# Patient Record
Sex: FEMALE | NOT HISPANIC OR LATINO | Employment: UNEMPLOYED | URBAN - METROPOLITAN AREA
[De-identification: names, ages, dates, MRNs, and addresses within clinical notes are randomized per-mention and may not be internally consistent; named-entity substitution may affect disease eponyms.]

---

## 2017-04-08 ENCOUNTER — OFFICE VISIT (OUTPATIENT)
Dept: URGENT CARE | Facility: URGENT CARE | Age: 22
End: 2017-04-08
Payer: COMMERCIAL

## 2017-04-08 VITALS
HEIGHT: 64 IN | TEMPERATURE: 98.8 F | BODY MASS INDEX: 35.85 KG/M2 | WEIGHT: 210 LBS | SYSTOLIC BLOOD PRESSURE: 138 MMHG | OXYGEN SATURATION: 99 % | DIASTOLIC BLOOD PRESSURE: 86 MMHG | HEART RATE: 87 BPM

## 2017-04-08 DIAGNOSIS — I88.9 LYMPHADENITIS: Primary | ICD-10-CM

## 2017-04-08 DIAGNOSIS — H92.01 ACUTE PAIN OF RIGHT EAR: ICD-10-CM

## 2017-04-08 PROCEDURE — 99213 OFFICE O/P EST LOW 20 MIN: CPT

## 2017-04-08 RX ORDER — AMOXICILLIN 500 MG/1
500 CAPSULE ORAL 3 TIMES DAILY
Qty: 30 CAPSULE | Refills: 0 | Status: SHIPPED | OUTPATIENT
Start: 2017-04-08 | End: 2017-06-25

## 2017-04-08 NOTE — MR AVS SNAPSHOT
"              After Visit Summary   4/8/2017    Sergey Whitehead    MRN: [id number]           Patient Information     Date Of Birth          1995        Visit Information        Provider Department      4/8/2017 6:00 PM Provider, [facility]        Today's Diagnoses     Lymphadenitis    -  1    Acute pain of right ear           Follow-ups after your visit        Your next 10 appointments already scheduled     Apr 11, 2017  2:40 PM CDT   SHORT with SARY Motley CNP   [facility] ([facility])    [address] [place] [address]   [phone]              Who to contact     If you have questions or need follow up information about today's clinic visit or your schedule please contact [facility] directly at [phone].  Normal or non-critical lab and imaging results will be communicated to you by MyChart, letter or phone within 4 business days after the clinic has received the results. If you do not hear from us within 7 days, please contact the clinic through MyChart or phone. If you have a critical or abnormal lab result, we will notify you by phone as soon as possible.  Submit refill requests through Azooo or call your pharmacy and they will forward the refill request to us. Please allow 3 business days for your refill to be completed.          Additional Information About Your Visit        MyChart Information     Azooo lets you send messages to your doctor, view your test results, renew your prescriptions, schedule appointments and more. To sign up, go to [email or web address] . Click on \"Log in\" on the left side of the screen, which will take you to the Welcome page. Then click on \"Sign up Now\" on the right side of the page.     You will be asked to enter the access code listed below, as well as some personal information. Please follow the directions to create your username " "and password.     Your access code is: [email or web address]  Expires: 2017  1:55 PM     Your access code will  in 90 days. If you need help or a new code, please call your [place] clinic or [phone].        Care EveryWhere ID     This is your Care EveryWhere ID. This could be used by other organizations to access your [facility] [place] medical records  [id number]        Your Vitals Were     Pulse Temperature Height Pulse Oximetry BMI (Body Mass Index)       87 98.8  F (37.1  C) (Oral) 5' 4\" (1.626 m) 99% 36.05 kg/m2        Blood Pressure from Last 3 Encounters:   17 138/86   07/15/16 122/73   16 130/80    Weight from Last 3 Encounters:   17 210 lb (95.3 kg) (98 %)*   07/15/16 209 lb 3.2 oz (94.9 kg) (98 %)*   16 213 lb 1.6 oz (96.7 kg) (98 %)*     * Growth percentiles are based on [facility] 2-20 Years data.              Today, you had the following     No orders found for display         Today's Medication Changes          These changes are accurate as of: 17  6:27 PM.  If you have any questions, ask your nurse or doctor.               Start taking these medicines.        Dose/Directions    amoxicillin 500 MG capsule   Commonly known as:  AMOXIL   Used for:  Lymphadenitis, Acute pain of right ear   Started by:  Provider, [facility]        Dose:  500 mg   Take 1 capsule (500 mg) by mouth 3 times daily   Quantity:  30 capsule   Refills:  0            Where to get your medicines      These medications were sent to [facility]/pharmacy #[id number] - [place], [place] - [address] AT [facility]  [address] [place] [address]    Hours:  24-hours Phone:  [phone]     amoxicillin 500 MG capsule                Primary Care Provider    None Specified       No primary provider on file.        Thank you!     Thank you for choosing [facility]  for your care. Our goal is always to provide you with excellent care. Hearing back from our patients is one way we can " continue to improve our services. Please take a few minutes to complete the written survey that you may receive in the mail after your visit with us. Thank you!             Your Updated Medication List - Protect others around you: Learn how to safely use, store and throw away your medicines at www.disposemymeds.org.          This list is accurate as of: 4/8/17  6:27 PM.  Always use your most recent med list.                   Brand Name Dispense Instructions for use    amoxicillin 500 MG capsule    AMOXIL    30 capsule    Take 1 capsule (500 mg) by mouth 3 times daily       escitalopram 20 MG tablet    LEXAPRO    30 tablet    Take 1 tablet (20 mg) by mouth daily       medroxyPROGESTERone 150 MG/ML injection    DEPO-PROVERA    3 mL    Inject 1 mL (150 mg) into the muscle every 3 months

## 2017-04-08 NOTE — PROGRESS NOTES
"Chief Complaint   Patient presents with     Urgent Care     Ear Problem     Has had pain in right ear for past 2 days, has become excruciating today.  Has taken ibuprofen, benadryl and excedrin but not effective for the pain.     Mass     Right anterior neck lymph node has been swollen/painful for past 2 days, has gotten much worse today.  Strep test was negative Thursday.        HPI:    Sergey Whitehead is a 21 year old female with right ear ache for 2 days.  She has tried lozenges and benadryl.  She has moderate pain.  She has no cough or vomiting.  She has a sore throat.  She has a neg strep on Thur.    ROS:  General:  No night sweats reported  ENT: No epistaxis  Skin: No petechiae  Psychiatric: Not combative  : No hematuria reported    Past Medical History:   Diagnosis Date     Choanal atresia      Constipation      Ureteral reflux        Past Surgical History:   Procedure Laterality Date     SURGICAL HISTORY OF -       Urologic surgery        Allergies   Allergen Reactions     Bactrim      Codeine      /86 (BP Location: Right arm, Cuff Size: Adult Regular)  Pulse 87  Temp 98.8  F (37.1  C) (Oral)  Ht 5' 4\" (1.626 m)  Wt 210 lb (95.3 kg)  SpO2 99%  BMI 36.05 kg/m2  PE:  Gen: 21 year old female appears stated age  Eyes: Equal and round  Head: NC, AT, GCS 15  ENT: Canal and nares patent, TMs clear, tiny right infraauricular lympadenopathy that is tender, no redness, swelling, or shift in pharynx  Extremity: MAEW  Skin: Warm and dry  Psych: Mood and affect normal  Neuro: CN II-XII grossly in tact    IMPRESSION:  (I88.9) Lymphadenitis  (primary encounter diagnosis)  Comment:   Plan: amoxicillin (AMOXIL) 500 MG capsule        Tid x 10 d    (H92.01) Acute pain of right ear  Plan: amoxicillin (AMOXIL) 500 MG capsule TID x 10d        "

## 2017-06-25 ENCOUNTER — HOSPITAL ENCOUNTER (EMERGENCY)
Facility: CLINIC | Age: 22
Discharge: HOME | End: 2017-06-26
Attending: EMERGENCY MEDICINE | Admitting: EMERGENCY MEDICINE
Payer: COMMERCIAL

## 2017-06-25 VITALS
OXYGEN SATURATION: 99 % | HEIGHT: 64 IN | SYSTOLIC BLOOD PRESSURE: 157 MMHG | RESPIRATION RATE: 18 BRPM | DIASTOLIC BLOOD PRESSURE: 115 MMHG | HEART RATE: 107 BPM | WEIGHT: 210 LBS | TEMPERATURE: 99.1 F | BODY MASS INDEX: 35.85 KG/M2

## 2017-06-25 DIAGNOSIS — Z72.89 DELIBERATE SELF-CUTTING: ICD-10-CM

## 2017-06-25 DIAGNOSIS — F32.A DEPRESSION, UNSPECIFIED DEPRESSION TYPE: ICD-10-CM

## 2017-06-25 PROCEDURE — 90791 PSYCH DIAGNOSTIC EVALUATION: CPT

## 2017-06-25 PROCEDURE — 99285 EMERGENCY DEPT VISIT HI MDM: CPT | Mod: 25

## 2017-06-25 ASSESSMENT — ENCOUNTER SYMPTOMS
WOUND: 1
NUMBNESS: 0
WEAKNESS: 0

## 2017-06-25 NOTE — ED AVS SNAPSHOT
Emergency Department    [address] [facility] [address]    Phone:  [phone]    Fax:  [phone]                                       Sergey Whitehead   MRN: [id number]    Department:   Emergency Department   Date of Visit:  6/25/2017           Patient Information     Date Of Birth          1995        Your diagnoses for this visit were:     Depression, unspecified depression type     Deliberate self-cutting        You were seen by Geeta Sheppard MD and Mariposa Davenport MD.      Follow-up Information     Follow up with Attend your therapy appointments scheduled by DEC.        Follow up with  Emergency Department.    Specialty:  EMERGENCY MEDICINE    Why:  As needed, If symptoms worsen    Contact information:    [address] [facility] [address] [phone]        Discharge Instructions       *Neosporin or bacitracin to the abrasions.  *No new medications.  *Follow-up up as arranged by DEC.  *Return if you develop thoughts of wanting to harm yourself or others, develops signs of infection or become worse in any way.        Depression  Depression is one of the most common mental health problems today. It is not just a state of unhappiness or sadness. It is a true disease. The cause seems to be related to a decrease in chemicals that transmit signals in the brain. Having a family history of depression, alcoholism, or suicide increases the risk. Chronic illness, chronic pain, migraine headaches and high emotional stress also increase the risk.  Depression is something we tend to recognize in others, but may have a hard time seeing in ourselves. It can show in many physical and emotional ways:    Loss of appetite    Over-eating    Not being able to sleep    Sleeping too much    Tiredness not related to physical exertion    Restlessness or irritability    Slowness of movement or speech    Feeling depressed or withdrawn    Loss of interest in things you  once enjoyed    Trouble concentrating, poor memory, trouble making decisions    Thoughts of harming or killing oneself, or thoughts that life is not worth living    Low self-esteem  The treatment for depression may include both medicine and psychotherapy. Antidepressants can reduce suffering and can improve the ability to function during the depressed period. Therapy can offer emotional support and help you understand emotional factors that may be causing the depression.  Home care    On-going care and support helps people manage this disease.  Find a healthcare provider and therapist who meet your needs. Seek help when you feel like you may be getting ill.    Be kind to yourself. Make it a point to do things that you enjoy (gardening, walking in nature, going to a movie, etc.). Reward yourself for small successes.    Take care of your physical body. Eat a balanced diet (low in saturated fat and high in fruits and vegetables). Exercise at least 3 times a week for 30 minutes. Even mild-moderate exercise (like brisk walking) can make you feel better.    Avoid alcohol, which can make depression worse.    Take medicine as prescribed.    Tell each of your healthcare providers about all of the prescription drugs, over-the-counter medicines, vitamins, and supplements you take. Certain supplements interact with medicines and can result in dangerous side effects. Ask your pharmacist when you have questions about drug interactions.    Talk with your family and trusted friends about your feelings and thoughts. Ask them to help you recognize behavior changes early so you can get help and, if needed, medicine can be adjusted.  Follow-up care  Follow up with your healthcare provider, or as advised.  Call 911  Call 911 if you:    Have suicidal thoughts, a suicide plan, and the means to carry out the plan    Have trouble breathing    Are very confused    Feel very drowsy or have trouble awakening    Faint or lose  consciousness    Have new chest pain that becomes more severe, lasts longer, or spreads into your shoulder, arm, neck, jaw or back  When to seek medical advice  Call your healthcare provider right away if any of these occur:    Feeling extreme depression, fear, anxiety, or anger toward yourself or others    Feeling out of control    Feeling that you may try to harm yourself or another    Hearing voices that others do not hear    Seeing things that others do not see    Can t sleep or eat for 3 days in a row    Friends or family express concern over your behavior and ask you to seek help  Date Last Reviewed: 9/29/2015 2000-2017 [employer] [address] [place] [address], [place] [address]. All rights reserved. This information is not intended as a substitute for professional medical care. Always follow your healthcare professional's instructions.            24 Hour Appointment Hotline       To make an appointment at any [facility], call [phone] ([phone]). If you don't have a family doctor or clinic, we will help you find one. [facility] clinics are conveniently located to serve the needs of you and your family.             Review of your medicines      Our records show that you are taking the medicines listed below. If these are incorrect, please call your family doctor or clinic.        Dose / Directions Last dose taken    escitalopram 20 MG tablet   Commonly known as:  LEXAPRO   Dose:  20 mg   Quantity:  30 tablet        Take 1 tablet (20 mg) by mouth daily   Refills:  0                Orders Needing Specimen Collection     None      Pending Results     No orders found for last 3 day(s).            Pending Culture Results     No orders found for last 3 day(s).            Pending Results Instructions     If you had any lab results that were not finalized at the time of your Discharge, you can call the ED Lab Result RN at [phone]. You will be contacted by this team for any positive Lab  results or changes in treatment. The nurses are available 7 days a week from 10A to 6:30P.  You can leave a message 24 hours per day and they will return your call.        Test Results From Your Hospital Stay               Clinical Quality Measure: Blood Pressure Screening     Your blood pressure was checked while you were in the emergency department today. The last reading we obtained was  BP: (!) 157/115 . Please read the guidelines below about what these numbers mean and what you should do about them.  If your systolic blood pressure (the top number) is less than 120 and your diastolic blood pressure (the bottom number) is less than 80, then your blood pressure is normal. There is nothing more that you need to do about it.  If your systolic blood pressure (the top number) is 120-139 or your diastolic blood pressure (the bottom number) is 80-89, your blood pressure may be higher than it should be. You should have your blood pressure rechecked within a year by a primary care provider.  If your systolic blood pressure (the top number) is 140 or greater or your diastolic blood pressure (the bottom number) is 90 or greater, you may have high blood pressure. High blood pressure is treatable, but if left untreated over time it can put you at risk for heart attack, stroke, or kidney failure. You should have your blood pressure rechecked by a primary care provider within the next 4 weeks.  If your provider in the emergency department today gave you specific instructions to follow-up with your doctor or provider even sooner than that, you should follow that instruction and not wait for up to 4 weeks for your follow-up visit.        Thank you for choosing [facility]       Thank you for choosing [facility] for your care. Our goal is always to provide you with excellent care. Hearing back from our patients is one way we can continue to improve our services. Please take a few minutes to complete the written survey that you may  "receive in the mail after you visit with us. Thank you!        F[employer]helper[employer] Information     Powtoon lets you send messages to your doctor, view your test results, renew your prescriptions, schedule appointments and more. To sign up, go to [email or web address] . Click on \"Log in\" on the left side of the screen, which will take you to the Welcome page. Then click on \"Sign up Now\" on the right side of the page.     You will be asked to enter the access code listed below, as well as some personal information. Please follow the directions to create your username and password.     Your access code is: [email or web address]  Expires: 2017  1:55 PM     Your access code will  in 90 days. If you need help or a new code, please call your [facility] clinic or [phone].        Care EveryWhere ID     This is your Care EveryWhere ID. This could be used by other organizations to access your [facility] medical records  [id number]        Equal Access to Services     HIMANSHU OLSON : Hadii scar de la rosao Sobeatriz, waaxda luqadaha, qaybta kaalmada aderossi, kamilah baron . So [facility] [place] [phone].    ATENCIÓN: Si habla español, tiene a arce disposición servicios gratuitos de asistencia lingüística. Llame al [phone].    We comply with applicable federal civil rights laws and [place] laws. We do not discriminate on the basis of race, color, national origin, age, disability sex, sexual orientation or gender identity.            After Visit Summary       This is your record. Keep this with you and show to your community pharmacist(s) and doctor(s) at your next visit.                  "

## 2017-06-25 NOTE — ED AVS SNAPSHOT
Emergency Department    [address] [place] [address]    Phone:  [phone]    Fax:  [phone]                                       Sergey Whitehead   MRN: [id number]    Department:   Emergency Department   Date of Visit:  6/25/2017           After Visit Summary Signature Page     I have received my discharge instructions, and my questions have been answered. I have discussed any challenges I see with this plan with the nurse or doctor.    ..........................................................................................................................................  Patient/Patient Representative Signature      ..........................................................................................................................................  Patient Representative Print Name and Relationship to Patient    ..................................................               ................................................  Date                                            Time    ..........................................................................................................................................  Reviewed by Signature/Title    ...................................................              ..............................................  Date                                                            Time
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
## 2017-06-26 NOTE — DISCHARGE INSTRUCTIONS
*Neosporin or bacitracin to the abrasions.  *No new medications.  *Follow-up up as arranged by DEC.  *Return if you develop thoughts of wanting to harm yourself or others, develops signs of infection or become worse in any way.        Depression  Depression is one of the most common mental health problems today. It is not just a state of unhappiness or sadness. It is a true disease. The cause seems to be related to a decrease in chemicals that transmit signals in the brain. Having a family history of depression, alcoholism, or suicide increases the risk. Chronic illness, chronic pain, migraine headaches and high emotional stress also increase the risk.  Depression is something we tend to recognize in others, but may have a hard time seeing in ourselves. It can show in many physical and emotional ways:    Loss of appetite    Over-eating    Not being able to sleep    Sleeping too much    Tiredness not related to physical exertion    Restlessness or irritability    Slowness of movement or speech    Feeling depressed or withdrawn    Loss of interest in things you once enjoyed    Trouble concentrating, poor memory, trouble making decisions    Thoughts of harming or killing oneself, or thoughts that life is not worth living    Low self-esteem  The treatment for depression may include both medicine and psychotherapy. Antidepressants can reduce suffering and can improve the ability to function during the depressed period. Therapy can offer emotional support and help you understand emotional factors that may be causing the depression.  Home care    On-going care and support helps people manage this disease.  Find a healthcare provider and therapist who meet your needs. Seek help when you feel like you may be getting ill.    Be kind to yourself. Make it a point to do things that you enjoy (gardening, walking in nature, going to a movie, etc.). Reward yourself for small successes.    Take care of your physical body. Eat a  balanced diet (low in saturated fat and high in fruits and vegetables). Exercise at least 3 times a week for 30 minutes. Even mild-moderate exercise (like brisk walking) can make you feel better.    Avoid alcohol, which can make depression worse.    Take medicine as prescribed.    Tell each of your healthcare providers about all of the prescription drugs, over-the-counter medicines, vitamins, and supplements you take. Certain supplements interact with medicines and can result in dangerous side effects. Ask your pharmacist when you have questions about drug interactions.    Talk with your family and trusted friends about your feelings and thoughts. Ask them to help you recognize behavior changes early so you can get help and, if needed, medicine can be adjusted.  Follow-up care  Follow up with your healthcare provider, or as advised.  Call 911  Call 911 if you:    Have suicidal thoughts, a suicide plan, and the means to carry out the plan    Have trouble breathing    Are very confused    Feel very drowsy or have trouble awakening    Faint or lose consciousness    Have new chest pain that becomes more severe, lasts longer, or spreads into your shoulder, arm, neck, jaw or back  When to seek medical advice  Call your healthcare provider right away if any of these occur:    Feeling extreme depression, fear, anxiety, or anger toward yourself or others    Feeling out of control    Feeling that you may try to harm yourself or another    Hearing voices that others do not hear    Seeing things that others do not see    Can t sleep or eat for 3 days in a row    Friends or family express concern over your behavior and ask you to seek help  Date Last Reviewed: 9/29/2015 2000-2017 [employer]. [address] [place] [address], [place] [address]. All rights reserved. This information is not intended as a substitute for professional medical care. Always follow your healthcare professional's instructions.

## 2018-05-10 ENCOUNTER — OFFICE VISIT (OUTPATIENT)
Dept: URGENT CARE | Facility: URGENT CARE | Age: 23
End: 2018-05-10
Payer: COMMERCIAL

## 2018-05-10 VITALS
TEMPERATURE: 97.6 F | SYSTOLIC BLOOD PRESSURE: 119 MMHG | DIASTOLIC BLOOD PRESSURE: 81 MMHG | OXYGEN SATURATION: 100 % | HEART RATE: 88 BPM | RESPIRATION RATE: 16 BRPM

## 2018-05-10 DIAGNOSIS — R55 VASOVAGAL SYNCOPE: Primary | ICD-10-CM

## 2018-05-10 PROCEDURE — 99213 OFFICE O/P EST LOW 20 MIN: CPT | Performed by: FAMILY MEDICINE

## 2018-05-10 NOTE — PROGRESS NOTES
Chief Complaint   Patient presents with     Urgent Care     Pt c/o hot flashes, ringing in the ears and fainting spells.  Sx off on on for one day, but pt had many episodes in the past.      SUBJECTIVE:  Sergey Whitehead, a 22 year old female with h/o depression , anxiety,h/o previous fainting spells and ADD recently stopped adderral scheduled an appointment to discuss the following issues:  Vasovagal syncope     She is here with her friend as today while she was at a meeting she felt hot flash effect and also felt ringing in the ear along with almost near fainting feeling   This happened about 2 hrs back then she left the meeting and felt better immediately .,she is here for follow up on that   Denies any feeling of dizziness or ringing in the ear now   Denies any worsening anxiety symptoms now , she is due for visit with psychiatrist soon   Has no chest symptoms , palpitations or chest pain or sob now     Past Medical History:   Diagnosis Date     Choanal atresia      Constipation      Ureteral reflux       Past Surgical History:   Procedure Laterality Date     SURGICAL HISTORY OF -       Urologic surgery         Social History     Social History     Marital status: Single     Spouse name: N/A     Number of children: N/A     Years of education: N/A     Occupational History     Not on file.     Social History Main Topics     Smoking status: Current Every Day Smoker     Smokeless tobacco: Never Used      Comment: smokes 2 cigarettes     Alcohol use No     Drug use: No     Sexual activity: Yes     Partners: Female     Other Topics Concern     Not on file     Social History Narrative        Current Outpatient Prescriptions   Medication Sig Dispense Refill     escitalopram (LEXAPRO) 20 MG tablet Take 1 tablet (20 mg) by mouth daily (Patient not taking: Reported on 5/10/2018) 30 tablet 0       Health Maintenance   Topic Date Due     HIV SCREEN (SYSTEM ASSIGNED)  12/15/2015     CHLAMYDIA SCREENING  12/08/2016      PHQ-9 Q6 MONTHS  01/15/2017     DEPRESSION ACTION PLAN Q1 YR  07/13/2017     INFLUENZA VACCINE (Season Ended) 09/01/2018     TETANUS IMMUNIZATION (SYSTEM ASSIGNED)  08/24/2020     PEDS DTAP/TDAP (8 - Td) 07/13/2026     HPV IMMUNIZATION  Completed        ROS:  CONSTITUTIONAL:no fever, no chills or sweats, no excessive fatigue, no significant change in weight  CV: neg   RESP -neg  GI:  Neg   NEURO: neg   MSK - neg   Skin - neg   Pyschiatry-neg     OBJECTIVE:  /81  Pulse 88  Temp 97.6  F (36.4  C) (Oral)  Resp 16  SpO2 100%      EXAM:  GENERAL APPEARANCE: healthy, alert and no distress  EYES: EOMI,  PERRL  HENT: ear canals and TM's normal and nose and mouth without ulcers or lesions  RESP: lungs clear to auscultation - no rales, rhonchi or wheezes  CV: regular rates and rhythm, normal S1 S2, no S3 or S4 and no murmur, click or rub -  ABDOMEN:  soft, nontender, no HSM or masses and bowel sounds normal  NEURO: Normal strength and tone, sensory exam grossly normal, mentation intact and speech normal  PSYCH: mentation appears normal and affect normal/bright        ASSESSMENT/PLAN:  Sergey was seen today for urgent care.    Diagnoses and all orders for this visit:    Vasovagal syncope      Discussed with pt that she has vasovagal episodes   I did discuss with her about the condition   Discussed if notices any hot flashes in the future should try and sit down avoid factors that might cause these symptoms   Advised to f/u with psychiatrist for her ADD and anxiety symptoms       Follow up if  symptoms fail to improve or worsens   Pt understood and agreed with plan           Melania Barbour MD

## 2018-05-10 NOTE — PATIENT INSTRUCTIONS
Understanding Vasovagal Syncope  Vasovagal syncope is fainting caused by a complex nerve and blood vessel reaction in the body. It s the most common cause of fainting. Unlike other causes of fainting, it s not a sign of a problem with the heart or brain.     How to say it  [id number]  [id number]   How vasovagal syncope happens  Many nerves connect with your heart and blood vessels. These nerves help control the speed and force of your heartbeat. They also regulate blood pressure. They control whether your blood vessels should be more open or more closed.  Usually these nerves work together so you always get enough blood to your brain. In certain cases, these nerves may give a wrong signal. This may cause your blood vessels to open wide. At the same time, your heartbeat slows down. Blood can start to pool in your legs, and not enough of it may reach the brain. If that happens, you may briefly lose consciousness. When you lie down or fall down, blood flow to the brain resumes.  What causes vasovagal syncope?  Many triggers can cause vasovagal syncope, such as:    Standing for long periods    Too much heat    Intense emotion, such as fear    Intense pain    The sight of blood or a needle    Exercising for a long time  Older adults may have additional triggers, such as:    Urinating    Swallowing    Coughing    Having a bowel movement  Symptoms of vasovagal syncope  Fainting is the main symptom of vasovagal syncope. You may have symptoms before fainting such as:    Nausea    Warm, flushed feeling    Face that turns pale    Sweaty palms    Feeling dizzy    Blurred vision  If you lie down at the first sign of these symptoms, you will often be able to prevent fainting. Not everyone notices symptoms before fainting, however.  When a person does faint, lying down restores blood flow to the brain. Consciousness should return fairly quickly. You might not feel normal for a little while after you faint. You might feel  depressed or fatigued for a short time. You may even feel nauseous afterwards and vomit.  Some people have only 1 or 2 episodes of vasovagal syncope in their life. For others, it happens more often and with no warning.  Diagnosing vasovagal syncope  Your healthcare provider will ask about your health history and your symptoms. He or she will give you a physical exam. Your blood pressure may be measured while lying down, seated, and standing. You may also have an electrocardiogram (ECG). This is a simple test that looks at the heart s rhythm.  You may be checked for other possible causes of fainting. You may have other tests such as:    Continuous portable ECG monitoring, to look at heart rhythms over time, such as with a holter or event monitor    Echocardiogram, to look at the blood flow in the heart and the heart s motion    Exercise stress testing, to see how your heart does during exercise    Blood tests to check for signs of disease  If these tests are normal, you may have a tilt table test. For this test, you lie down on a platform. Your heart rate and blood pressure are measured while you are lying down. The platform is then tilted upright. Your heart rate and blood pressure are measured again. If you have vasovagal syncope, you may faint during the upward tilt. Sometimes medicines that increase heart rate and the force of heart contractions are used to try and provoke a syncopal episode.  There are many causes of syncope. Some causes are not dangerous. In older persons, unexplained syncope can be a sign of a serious infection or a heart attack. Call 911 or seek immediate medical attention to be evaluated, especially if there has been a fall and injury with the syncope. You should not drive yourself to the hospital or emergency department after a syncopal episode for the safety of yourself or other drivers and passengers. Have someone drive you. Your provider may restrict your driving until the cause of the  syncope is better understood and to make sure the syncope does not become chronic or if it is unpredictable.  Date Last Reviewed: 3/1/2017    0108-7070 [employer]. [address] [place] [address], [place] [address]. All rights reserved. This information is not intended as a substitute for professional medical care. Always follow your healthcare professional's instructions.        Treatment for Vasovagal Syncope  Vasovagal syncope is fainting caused by a complex nerve and blood vessel reaction in the body. It s the most common cause of fainting. Unlike other causes of fainting, it s not a sign of a problem with the heart or brain.     How to say it  [id number]  [id number]-o-pee   Types of treatment  If you have had episodes of vasovagal syncope, your healthcare provider may tell you how to help prevent fainting. These might include:    Avoiding known triggers, such as standing for a long time or getting too hot    Stopping medicines that lower blood pressure, such as diuretics    Eating foods with more salt, to help keep up blood volume    Drinking plenty of fluids, to maintain blood volume    Doing moderate exercise such as lower leg strength training    Wearing support stockings to prevent blood pooling in the legs while standing  In some case, your healthcare provider may prescribe a medicine to help control vasovagal syncope. Medicine may or may not work. Your healthcare provider may have you try one of the below:    Alpha-1-adrenergic agonist, to increase your blood pressure such as midodrine    Corticosteroids, to help increase your sodium and fluid levels such as fludrocortisone    Serotonin reuptake inhibitors (SSRIs), to manage your nervous system response  If these medicines don t work for you, your healthcare provider may advise orthostatic training. This method uses a tilt table to gradually increase the amount of time you are upright. In rare cases you may need a cardiac pacemaker to prevent ongoing  fainting.  Avoiding triggers  To help reduce the risk of fainting, try to avoid triggers such as:    Standing for long periods    Too much heat    Intense emotion, such as fear    Intense pain    The sight of blood or a needle    Exercising for a long time  Living with vasovagal syncope  Try to avoid situations that put you at risk and stay well hydrated. Don't skip meals.  Watch for the warning signs of vasovagal syncope. These can include:    Nausea    Warm, flushed feeling    Face that turns pale    Sweaty palms    Feeling dizzy    Blurred vision  If you think you are about to faint, try one or more of these tips:    Lie down right away.    Prop your feet up so that they are higher than your head.    Tense up your arms.    Cross your legs.  If you faint, once you regain consciousness you should rest for a little while before moving around again.  Possible complications of vasovagal syncope  Fainting can be dangerous if it happens at certain times, like while driving. If you have chronic syncope that is not under control, your healthcare provider may advise you not to drive. This is especially important if you don t have warning signs before you faint. Talk with your healthcare provider about what s safe for you to do.     When to call your healthcare provider  Call your healthcare provider if you have fainting that occurs more often  or if you sustain significant injury from your fainting spell.  Unexplained syncope or fainting, especially in older people, can actually be signs of a serious life threatening condition such as a heart attack. Call 911 or seek immediate medical care if the cause of syncope is not known.  Don't drive yourself to the emergency department if you have had a syncopal episode to prevent injury to yourself or other passengers or drivers in the event another episode occurs while you are behind the wheel of a car.   Date Last Reviewed: 2/1/2017 2000-2017 [employer] [address]  [place] [address]. All rights reserved. This information is not intended as a substitute for professional medical care. Always follow your healthcare professional's instructions.        Causes of Syncope  Syncope (fainting) has many causes. Sometimes it is not serious. In other cases, syncope is a sign of a heart problem. But treatment can help    When syncope is not serious  Your healthcare provider may call your problem vasovagal syncope, reflex syncope, or orthostatic hypotension. These types of syncope are generally not serious. They can be caused by:    Strong feelings, such as anxiety or fear. A nerve signal may briefly change your heart rate and lower your blood pressure too much.    Standing for too long. Standing may cause blood to pool in your legs. When this happens, your brain may not receive all the blood it needs.    Standing up too quickly. Your blood pressure may not adjust fast enough to changes in posture and may drop too low. Certain medicines can also cause this problem. Examples of medicines that can cause a drop in blood pressure include diuretics, blood pressure medicines, and medicines for chest pain. Your pharmacist or healthcare provider can discuss these with you.    Reaction to normal body functions. When you go to the bathroom, have gastrointestinal discomfort, nausea, or pain, your heart may have a natural reflex to slow down and lower blood pressure. This can result in syncope. This may also follow exercise, eating, laughter, weight lifting, or playing musical instruments like the trumpet or trombone.  When heart trouble causes syncope  A heart problem can decrease the amount of oxygen-rich blood that reaches the brain. Heart trouble can be serious and even life threatening if not treated:    A slow heart rate. Electrical signals tell the chambers of the heart when to pump. But the signals may be slowed or blocked (heart block) as they travel on the heart s  electrical pathways. This can be caused by aging, scarred heart tissue, or damage from heart disease. When the heart rate slows, not enough blood is pumped.    A fast heart rate. Certain problems can make the heart race. For instance, after a heart attack, also known as acute myocardial infarction, or AMI, abnormal electrical signals may be created. These signals can make the heart suddenly beat very fast. The heart pumps before the chambers can fill with blood. So less blood reaches the brain and other parts of the body. Illegal drugs, certain medicines, heart disease, or an inherited condition can also cause this.    A heart valve problem. Blood travels through the chambers of the heart as it is pumped. Heart valves open and close to help move blood in the right direction. But a valve may not open or close fully, if it s hardened or scarred. As a result, less blood is pumped through the heart to the brain and body. Most often, syncope occurs when a person's aortic valve is critically narrowed and he or she participates in  a strenuous activity.    A heart muscle problem. Some people develop a thickened heart muscle that blocks blood flow out of the heart to the body. This is called hypertrophic cardiomyopathy. Being dehydrated and having hypertrophic cardiomyopathy can increase the risk for syncope.  Whatever the cause of syncope, it is important to be evaluated by your healthcare provider. You may need to be seen by a cardiologist, neurologist, or an ear, nose, and throat specialist. Do not drive, operate heavy machinery, or participate in activities in which you would be at risk for falls and injury if you have syncope and have not been evaluated.  Date Last Reviewed: 5/1/2016 2000-2017 [employer]. [address] [place] [address], [place] [address]. All rights reserved. This information is not intended as a substitute for professional medical care. Always follow your healthcare professional's  instructions.

## 2018-05-10 NOTE — MR AVS SNAPSHOT
After Visit Summary   5/10/2018    Sergey Whitehead    MRN: [id number]           Patient Information     Date Of Birth          1995        Visit Information        Provider Department      5/10/2018 5:10 PM Melania Barbour MD [facility]        Today's Diagnoses     Syncope, unspecified syncope type    -  1      Care Instructions      Understanding Vasovagal Syncope  Vasovagal syncope is fainting caused by a complex nerve and blood vessel reaction in the body. It s the most common cause of fainting. Unlike other causes of fainting, it s not a sign of a problem with the heart or brain.     How to say it  [id number]-gustavo  SINK-o-pee   How vasovagal syncope happens  Many nerves connect with your heart and blood vessels. These nerves help control the speed and force of your heartbeat. They also regulate blood pressure. They control whether your blood vessels should be more open or more closed.  Usually these nerves work together so you always get enough blood to your brain. In certain cases, these nerves may give a wrong signal. This may cause your blood vessels to open wide. At the same time, your heartbeat slows down. Blood can start to pool in your legs, and not enough of it may reach the brain. If that happens, you may briefly lose consciousness. When you lie down or fall down, blood flow to the brain resumes.  What causes vasovagal syncope?  Many triggers can cause vasovagal syncope, such as:    Standing for long periods    Too much heat    Intense emotion, such as fear    Intense pain    The sight of blood or a needle    Exercising for a long time  Older adults may have additional triggers, such as:    Urinating    Swallowing    Coughing    Having a bowel movement  Symptoms of vasovagal syncope  Fainting is the main symptom of vasovagal syncope. You may have symptoms before fainting such as:    Nausea    Warm, flushed feeling    Face that turns pale    Sweaty  palms    Feeling dizzy    Blurred vision  If you lie down at the first sign of these symptoms, you will often be able to prevent fainting. Not everyone notices symptoms before fainting, however.  When a person does faint, lying down restores blood flow to the brain. Consciousness should return fairly quickly. You might not feel normal for a little while after you faint. You might feel depressed or fatigued for a short time. You may even feel nauseous afterwards and vomit.  Some people have only 1 or 2 episodes of vasovagal syncope in their life. For others, it happens more often and with no warning.  Diagnosing vasovagal syncope  Your healthcare provider will ask about your health history and your symptoms. He or she will give you a physical exam. Your blood pressure may be measured while lying down, seated, and standing. You may also have an electrocardiogram (ECG). This is a simple test that looks at the heart s rhythm.  You may be checked for other possible causes of fainting. You may have other tests such as:    Continuous portable ECG monitoring, to look at heart rhythms over time, such as with a holter or event monitor    Echocardiogram, to look at the blood flow in the heart and the heart s motion    Exercise stress testing, to see how your heart does during exercise    Blood tests to check for signs of disease  If these tests are normal, you may have a tilt table test. For this test, you lie down on a platform. Your heart rate and blood pressure are measured while you are lying down. The platform is then tilted upright. Your heart rate and blood pressure are measured again. If you have vasovagal syncope, you may faint during the upward tilt. Sometimes medicines that increase heart rate and the force of heart contractions are used to try and provoke a syncopal episode.  There are many causes of syncope. Some causes are not dangerous. In older persons, unexplained syncope can be a sign of a serious infection  or a heart attack. Call 911 or seek immediate medical attention to be evaluated, especially if there has been a fall and injury with the syncope. You should not drive yourself to the hospital or emergency department after a syncopal episode for the safety of yourself or other drivers and passengers. Have someone drive you. Your provider may restrict your driving until the cause of the syncope is better understood and to make sure the syncope does not become chronic or if it is unpredictable.  Date Last Reviewed: 3/1/2017    2359-1273 [employer]. [address] [place] [address]. All rights reserved. This information is not intended as a substitute for professional medical care. Always follow your healthcare professional's instructions.        Treatment for Vasovagal Syncope  Vasovagal syncope is fainting caused by a complex nerve and blood vessel reaction in the body. It s the most common cause of fainting. Unlike other causes of fainting, it s not a sign of a problem with the heart or brain.     How to say it  [id number]-wo-QSW-gustavo  SINK-o-pee   Types of treatment  If you have had episodes of vasovagal syncope, your healthcare provider may tell you how to help prevent fainting. These might include:    Avoiding known triggers, such as standing for a long time or getting too hot    Stopping medicines that lower blood pressure, such as diuretics    Eating foods with more salt, to help keep up blood volume    Drinking plenty of fluids, to maintain blood volume    Doing moderate exercise such as lower leg strength training    Wearing support stockings to prevent blood pooling in the legs while standing  In some case, your healthcare provider may prescribe a medicine to help control vasovagal syncope. Medicine may or may not work. Your healthcare provider may have you try one of the below:    Alpha-1-adrenergic agonist, to increase your blood pressure such as midodrine    Corticosteroids, to help  increase your sodium and fluid levels such as fludrocortisone    Serotonin reuptake inhibitors (SSRIs), to manage your nervous system response  If these medicines don t work for you, your healthcare provider may advise orthostatic training. This method uses a tilt table to gradually increase the amount of time you are upright. In rare cases you may need a cardiac pacemaker to prevent ongoing fainting.  Avoiding triggers  To help reduce the risk of fainting, try to avoid triggers such as:    Standing for long periods    Too much heat    Intense emotion, such as fear    Intense pain    The sight of blood or a needle    Exercising for a long time  Living with vasovagal syncope  Try to avoid situations that put you at risk and stay well hydrated. Don't skip meals.  Watch for the warning signs of vasovagal syncope. These can include:    Nausea    Warm, flushed feeling    Face that turns pale    Sweaty palms    Feeling dizzy    Blurred vision  If you think you are about to faint, try one or more of these tips:    Lie down right away.    Prop your feet up so that they are higher than your head.    Tense up your arms.    Cross your legs.  If you faint, once you regain consciousness you should rest for a little while before moving around again.  Possible complications of vasovagal syncope  Fainting can be dangerous if it happens at certain times, like while driving. If you have chronic syncope that is not under control, your healthcare provider may advise you not to drive. This is especially important if you don t have warning signs before you faint. Talk with your healthcare provider about what s safe for you to do.     When to call your healthcare provider  Call your healthcare provider if you have fainting that occurs more often  or if you sustain significant injury from your fainting spell.  Unexplained syncope or fainting, especially in older people, can actually be signs of a serious life threatening condition such as  a heart attack. Call 911 or seek immediate medical care if the cause of syncope is not known.  Don't drive yourself to the emergency department if you have had a syncopal episode to prevent injury to yourself or other passengers or drivers in the event another episode occurs while you are behind the wheel of a car.   Date Last Reviewed: 2/1/2017 2000-2017 [employer]. [address] [place], [place] [id number]. All rights reserved. This information is not intended as a substitute for professional medical care. Always follow your healthcare professional's instructions.        Causes of Syncope  Syncope (fainting) has many causes. Sometimes it is not serious. In other cases, syncope is a sign of a heart problem. But treatment can help    When syncope is not serious  Your healthcare provider may call your problem vasovagal syncope, reflex syncope, or orthostatic hypotension. These types of syncope are generally not serious. They can be caused by:    Strong feelings, such as anxiety or fear. A nerve signal may briefly change your heart rate and lower your blood pressure too much.    Standing for too long. Standing may cause blood to pool in your legs. When this happens, your brain may not receive all the blood it needs.    Standing up too quickly. Your blood pressure may not adjust fast enough to changes in posture and may drop too low. Certain medicines can also cause this problem. Examples of medicines that can cause a drop in blood pressure include diuretics, blood pressure medicines, and medicines for chest pain. Your pharmacist or healthcare provider can discuss these with you.    Reaction to normal body functions. When you go to the bathroom, have gastrointestinal discomfort, nausea, or pain, your heart may have a natural reflex to slow down and lower blood pressure. This can result in syncope. This may also follow exercise, eating, laughter, weight lifting, or playing musical instruments like the  trumpet or trombone.  When heart trouble causes syncope  A heart problem can decrease the amount of oxygen-rich blood that reaches the brain. Heart trouble can be serious and even life threatening if not treated:    A slow heart rate. Electrical signals tell the chambers of the heart when to pump. But the signals may be slowed or blocked (heart block) as they travel on the heart s electrical pathways. This can be caused by aging, scarred heart tissue, or damage from heart disease. When the heart rate slows, not enough blood is pumped.    A fast heart rate. Certain problems can make the heart race. For instance, after a heart attack, also known as acute myocardial infarction, or AMI, abnormal electrical signals may be created. These signals can make the heart suddenly beat very fast. The heart pumps before the chambers can fill with blood. So less blood reaches the brain and other parts of the body. Illegal drugs, certain medicines, heart disease, or an inherited condition can also cause this.    A heart valve problem. Blood travels through the chambers of the heart as it is pumped. Heart valves open and close to help move blood in the right direction. But a valve may not open or close fully, if it s hardened or scarred. As a result, less blood is pumped through the heart to the brain and body. Most often, syncope occurs when a person's aortic valve is critically narrowed and he or she participates in  a strenuous activity.    A heart muscle problem. Some people develop a thickened heart muscle that blocks blood flow out of the heart to the body. This is called hypertrophic cardiomyopathy. Being dehydrated and having hypertrophic cardiomyopathy can increase the risk for syncope.  Whatever the cause of syncope, it is important to be evaluated by your healthcare provider. You may need to be seen by a cardiologist, neurologist, or an ear, nose, and throat specialist. Do not drive, operate heavy machinery, or participate  "in activities in which you would be at risk for falls and injury if you have syncope and have not been evaluated.  Date Last Reviewed: 2016-2017 [employer]. [address] [place] [address], [place] [address]. All rights reserved. This information is not intended as a substitute for professional medical care. Always follow your healthcare professional's instructions.                Follow-ups after your visit        Who to contact     If you have questions or need follow up information about today's clinic visit or your schedule please contact [facility] directly at [phone].  Normal or non-critical lab and imaging results will be communicated to you by [employer], letter or phone within 4 business days after the clinic has received the results. If you do not hear from us within 7 days, please contact the clinic through [employer] or phone. If you have a critical or abnormal lab result, we will notify you by phone as soon as possible.  Submit refill requests through [employer] or call your pharmacy and they will forward the refill request to us. Please allow 3 business days for your refill to be completed.          Additional Information About Your Visit        MyChart Information     [employer] lets you send messages to your doctor, view your test results, renew your prescriptions, schedule appointments and more. To sign up, go to [email or web address] . Click on \"Log in\" on the left side of the screen, which will take you to the Welcome page. Then click on \"Sign up Now\" on the right side of the page.     You will be asked to enter the access code listed below, as well as some personal information. Please follow the directions to create your username and password.     Your access code is: [email or web address]  Expires: 2018  5:37 PM     Your access code will  in 90 days. If you need help or a new code, please call your [facility] or [phone].        Care EveryWhere ID  "    This is your Care EveryWhere ID. This could be used by other organizations to access your [place] medical records  [id number]        Your Vitals Were     Pulse Temperature Respirations Pulse Oximetry          88 97.6  F (36.4  C) (Oral) 16 100%         Blood Pressure from Last 3 Encounters:   05/10/18 119/81   06/25/17 (!) 157/115   04/08/17 138/86    Weight from Last 3 Encounters:   06/25/17 210 lb (95.3 kg) (98 %)*   04/08/17 210 lb (95.3 kg) (98 %)*   07/15/16 209 lb 3.2 oz (94.9 kg) (98 %)*     * Growth percentiles are based on [facility] 2-20 Years data.              Today, you had the following     No orders found for display       Primary Care Provider Office Phone # Fax #    Lance Chang [phone]       [facility] [address] [facility] [address]        Equal Access to Services     : Hadii aad ku hadasho Soomaali, waaxda luqadaha, qaybta kaalmada adeegyada, waxay sebastianin leia baron . So [facility] [phone].    ATENCIÓN: Si habla español, tiene a arce disposición servicios gratuitos de asistencia lingüística. Llame al [phone].    We comply with applicable federal civil rights laws and [place] laws. We do not discriminate on the basis of race, color, national origin, age, disability, sex, sexual orientation, or gender identity.            Thank you!     Thank you for choosing [facility]  for your care. Our goal is always to provide you with excellent care. Hearing back from our patients is one way we can continue to improve our services. Please take a few minutes to complete the written survey that you may receive in the mail after your visit with us. Thank you!             Your Updated Medication List - Protect others around you: Learn how to safely use, store and throw away your medicines at www.disposemymeds.org.          This list is accurate as of 5/10/18  5:37 PM.  Always use your most recent med list.                   Brand  Name Dispense Instructions for use Diagnosis    escitalopram 20 MG tablet    LEXAPRO    30 tablet    Take 1 tablet (20 mg) by mouth daily    Major depressive disorder, recurrent episode, moderate (H)

## 2021-01-12 NOTE — NURSING NOTE
"Chief Complaint   Patient presents with     Urgent Care     Ear Problem     Has had pain in right ear for past 2 days, has become excruciating today.  Has taken ibuprofen, benadryl and excedrin but not effective for the pain.     Mass     Right anterior neck lymph node has been swollen/painful for past 2 days, has gotten much worse today.  Strep test was negative Thursday.     Initial /86 (BP Location: Right arm, Cuff Size: Adult Regular)  Pulse 87  Temp 98.8  F (37.1  C) (Oral)  Ht 5' 4\" (1.626 m)  Wt 210 lb (95.3 kg)  SpO2 99%  BMI 36.05 kg/m2 Estimated body mass index is 36.05 kg/(m^2) as calculated from the following:    Height as of this encounter: 5' 4\" (1.626 m).    Weight as of this encounter: 210 lb (95.3 kg)..  BP completed using cuff size: regular  YUNIER Kern  "
Discharged

## 2021-11-03 ENCOUNTER — OFFICE VISIT (OUTPATIENT)
Dept: URGENT CARE | Facility: URGENT CARE | Age: 26
End: 2021-11-03
Payer: COMMERCIAL

## 2021-11-03 VITALS
DIASTOLIC BLOOD PRESSURE: 79 MMHG | HEART RATE: 95 BPM | OXYGEN SATURATION: 98 % | SYSTOLIC BLOOD PRESSURE: 125 MMHG | WEIGHT: 210 LBS | BODY MASS INDEX: 36.05 KG/M2 | TEMPERATURE: 98.1 F

## 2021-11-03 DIAGNOSIS — N76.0 BV (BACTERIAL VAGINOSIS): ICD-10-CM

## 2021-11-03 DIAGNOSIS — M62.81 GENERALIZED MUSCLE WEAKNESS: ICD-10-CM

## 2021-11-03 DIAGNOSIS — N92.6 IRREGULAR MENSTRUAL BLEEDING: Primary | ICD-10-CM

## 2021-11-03 DIAGNOSIS — B96.89 BV (BACTERIAL VAGINOSIS): ICD-10-CM

## 2021-11-03 PROBLEM — J45.990 EXERCISE-INDUCED ASTHMA: Status: ACTIVE | Noted: 2021-06-03

## 2021-11-03 PROBLEM — S82.851D CLOSED TRIMALLEOLAR FRACTURE OF RIGHT ANKLE WITH ROUTINE HEALING: Status: ACTIVE | Noted: 2021-08-25

## 2021-11-03 PROBLEM — S82.871A: Status: ACTIVE | Noted: 2021-11-03

## 2021-11-03 PROBLEM — Z97.5 IUD (INTRAUTERINE DEVICE) IN PLACE: Status: ACTIVE | Noted: 2019-10-08

## 2021-11-03 PROBLEM — S93.431A ANKLE SYNDESMOSIS DISRUPTION, RIGHT, INITIAL ENCOUNTER: Status: ACTIVE | Noted: 2021-11-03

## 2021-11-03 PROBLEM — Z47.89 AFTERCARE FOLLOWING SURGERY OF THE MUSCULOSKELETAL SYSTEM: Status: ACTIVE | Noted: 2021-08-25

## 2021-11-03 PROBLEM — Z97.5 PRESENCE OF SUBDERMAL CONTRACEPTIVE IMPLANT: Status: ACTIVE | Noted: 2017-06-13

## 2021-11-03 PROBLEM — R68.89 EXERCISE INTOLERANCE: Status: ACTIVE | Noted: 2018-10-05

## 2021-11-03 PROBLEM — Z78.9 VEGETARIAN: Status: ACTIVE | Noted: 2019-10-30

## 2021-11-03 LAB
ALBUMIN UR-MCNC: NEGATIVE MG/DL
APPEARANCE UR: CLEAR
BACTERIA #/AREA URNS HPF: ABNORMAL /HPF
BASOPHILS # BLD AUTO: 0 10E3/UL (ref 0–0.2)
BASOPHILS NFR BLD AUTO: 0 %
BILIRUB UR QL STRIP: NEGATIVE
CLUE CELLS: PRESENT
COLOR UR AUTO: YELLOW
EOSINOPHIL # BLD AUTO: 0.1 10E3/UL (ref 0–0.7)
EOSINOPHIL NFR BLD AUTO: 1 %
ERYTHROCYTE [DISTWIDTH] IN BLOOD BY AUTOMATED COUNT: 13.8 % (ref 10–15)
GLUCOSE UR STRIP-MCNC: NEGATIVE MG/DL
HCG UR QL: NEGATIVE
HCT VFR BLD AUTO: 39.1 % (ref 35–47)
HGB BLD-MCNC: 12.1 G/DL (ref 11.7–15.7)
HGB UR QL STRIP: ABNORMAL
IMM GRANULOCYTES # BLD: 0 10E3/UL
IMM GRANULOCYTES NFR BLD: 0 %
KETONES UR STRIP-MCNC: NEGATIVE MG/DL
LEUKOCYTE ESTERASE UR QL STRIP: ABNORMAL
LYMPHOCYTES # BLD AUTO: 2.5 10E3/UL (ref 0.8–5.3)
LYMPHOCYTES NFR BLD AUTO: 29 %
MCH RBC QN AUTO: 24.6 PG (ref 26.5–33)
MCHC RBC AUTO-ENTMCNC: 30.9 G/DL (ref 31.5–36.5)
MCV RBC AUTO: 80 FL (ref 78–100)
MONOCYTES # BLD AUTO: 0.5 10E3/UL (ref 0–1.3)
MONOCYTES NFR BLD AUTO: 6 %
MUCOUS THREADS #/AREA URNS LPF: PRESENT /LPF
NEUTROPHILS # BLD AUTO: 5.5 10E3/UL (ref 1.6–8.3)
NEUTROPHILS NFR BLD AUTO: 65 %
NITRATE UR QL: NEGATIVE
PH UR STRIP: 6.5 [PH] (ref 5–7)
PLATELET # BLD AUTO: 267 10E3/UL (ref 150–450)
RBC # BLD AUTO: 4.91 10E6/UL (ref 3.8–5.2)
RBC #/AREA URNS AUTO: ABNORMAL /HPF
SP GR UR STRIP: 1.02 (ref 1–1.03)
SQUAMOUS #/AREA URNS AUTO: ABNORMAL /LPF
TRICHOMONAS, WET PREP: ABNORMAL
UROBILINOGEN UR STRIP-ACNC: 0.2 E.U./DL
WBC # BLD AUTO: 8.5 10E3/UL (ref 4–11)
WBC #/AREA URNS AUTO: ABNORMAL /HPF
WBC'S/HIGH POWER FIELD, WET PREP: ABNORMAL
YEAST, WET PREP: ABNORMAL

## 2021-11-03 PROCEDURE — 80053 COMPREHEN METABOLIC PANEL: CPT | Performed by: NURSE PRACTITIONER

## 2021-11-03 PROCEDURE — 85025 COMPLETE CBC W/AUTO DIFF WBC: CPT | Performed by: NURSE PRACTITIONER

## 2021-11-03 PROCEDURE — 87210 SMEAR WET MOUNT SALINE/INK: CPT | Performed by: NURSE PRACTITIONER

## 2021-11-03 PROCEDURE — 99204 OFFICE O/P NEW MOD 45 MIN: CPT | Performed by: NURSE PRACTITIONER

## 2021-11-03 PROCEDURE — 81025 URINE PREGNANCY TEST: CPT | Performed by: NURSE PRACTITIONER

## 2021-11-03 PROCEDURE — 81001 URINALYSIS AUTO W/SCOPE: CPT | Performed by: NURSE PRACTITIONER

## 2021-11-03 PROCEDURE — 36415 COLL VENOUS BLD VENIPUNCTURE: CPT | Performed by: NURSE PRACTITIONER

## 2021-11-03 RX ORDER — SERTRALINE HYDROCHLORIDE 100 MG/1
100 TABLET, FILM COATED ORAL DAILY
COMMUNITY
Start: 2021-04-07

## 2021-11-03 RX ORDER — METRONIDAZOLE 500 MG/1
500 TABLET ORAL 2 TIMES DAILY
Qty: 14 TABLET | Refills: 0 | Status: SHIPPED | OUTPATIENT
Start: 2021-11-03 | End: 2021-11-10

## 2021-11-03 NOTE — PROGRESS NOTES
Chief Complaint   Patient presents with     Urgent Care     Menstrual Problem     c/o irregular period for 3 weeks     SUBJECTIVE:  Sergey Whitehead is a 25 year old female who presents today with mesntraul bleeding for 3 weeks. It started at the normal time her period typically would, but has not stopped. The bleeding is heavy, both bright red, brown and black. She is having pelvic cramping in her lower abdomen. Has had her IUD placed since last year. Recently restarted antidepressant and is noting libido loss. Monogamous with same partner for years. No concerns for STDs, but there is an odor. She also relays some diarrhea and weakness. Known ovarian cyst on pelvic ultrasound. No current gynecologist.    Past Medical History:   Diagnosis Date     Choanal atresia      Constipation      Ureteral reflux      Current Outpatient Medications   Medication Sig Dispense Refill     metroNIDAZOLE (FLAGYL) 500 MG tablet Take 1 tablet (500 mg) by mouth 2 times daily for 7 days 14 tablet 0     sertraline (ZOLOFT) 100 MG tablet Take 100 mg by mouth daily       escitalopram (LEXAPRO) 20 MG tablet Take 1 tablet (20 mg) by mouth daily (Patient not taking: Reported on 11/3/2021) 30 tablet 0     Social History     Tobacco Use     Smoking status: Current Every Day Smoker     Smokeless tobacco: Never Used     Tobacco comment: smokes 2 cigarettes   Substance Use Topics     Alcohol use: No     Alcohol/week: 0.0 standard drinks     Allergies   Allergen Reactions     Codeine Unknown, Itching and Rash     Other reaction(s): Hives  Tolerated hydromorphone Aug 2021       Bactrim      Codeine      Sulfamethoxazole-Trimethoprim Rash     Other reaction(s): Hives     Review of Systems   All systems negative except for those listed above in HPI.    OBJECTIVE:  /79   Pulse 95   Temp 98.1  F (36.7  C) (Tympanic)   Wt 95.3 kg (210 lb)   LMP 11/03/2021   SpO2 98%   BMI 36.05 kg/m       Physical Exam  Vitals reviewed.   Constitutional:        General: She is not in acute distress.     Appearance: Normal appearance. She is not ill-appearing, toxic-appearing or diaphoretic.   HENT:      Head: Normocephalic and atraumatic.   Cardiovascular:      Rate and Rhythm: Normal rate.   Pulmonary:      Effort: Pulmonary effort is normal.   Abdominal:      Tenderness: There is abdominal tenderness.   Musculoskeletal:         General: Normal range of motion.   Skin:     General: Skin is warm and dry.      Findings: No rash.   Neurological:      General: No focal deficit present.      Mental Status: She is alert and oriented to person, place, and time.   Psychiatric:         Mood and Affect: Mood normal.         Behavior: Behavior normal.       Results for orders placed or performed in visit on 11/03/21   CBC with platelets and differential     Status: Abnormal   Result Value Ref Range    WBC Count 8.5 4.0 - 11.0 10e3/uL    RBC Count 4.91 3.80 - 5.20 10e6/uL    Hemoglobin 12.1 11.7 - 15.7 g/dL    Hematocrit 39.1 35.0 - 47.0 %    MCV 80 78 - 100 fL    MCH 24.6 (L) 26.5 - 33.0 pg    MCHC 30.9 (L) 31.5 - 36.5 g/dL    RDW 13.8 10.0 - 15.0 %    Platelet Count 267 150 - 450 10e3/uL    % Neutrophils 65 %    % Lymphocytes 29 %    % Monocytes 6 %    % Eosinophils 1 %    % Basophils 0 %    % Immature Granulocytes 0 %    Absolute Neutrophils 5.5 1.6 - 8.3 10e3/uL    Absolute Lymphocytes 2.5 0.8 - 5.3 10e3/uL    Absolute Monocytes 0.5 0.0 - 1.3 10e3/uL    Absolute Eosinophils 0.1 0.0 - 0.7 10e3/uL    Absolute Basophils 0.0 0.0 - 0.2 10e3/uL    Absolute Immature Granulocytes 0.0 <=0.0 10e3/uL   UA Macro with Reflex to Micro and Culture - lab collect     Status: Abnormal    Specimen: Urine, Midstream   Result Value Ref Range    Color Urine Yellow Colorless, Straw, Light Yellow, Yellow    Appearance Urine Clear Clear    Glucose Urine Negative Negative mg/dL    Bilirubin Urine Negative Negative    Ketones Urine Negative Negative mg/dL    Specific Gravity Urine 1.025 1.003 - 1.035     Blood Urine Moderate (A) Negative    pH Urine 6.5 5.0 - 7.0    Protein Albumin Urine Negative Negative mg/dL    Urobilinogen Urine 0.2 0.2, 1.0 E.U./dL    Nitrite Urine Negative Negative    Leukocyte Esterase Urine Small (A) Negative   HCG qualitative urine     Status: Normal   Result Value Ref Range    hCG Urine Qualitative Negative Negative   Urine Microscopic     Status: Abnormal   Result Value Ref Range    Bacteria Urine Moderate (A) None Seen /HPF    RBC Urine 0-2 0-2 /HPF /HPF    WBC Urine 0-5 0-5 /HPF /HPF    Squamous Epithelials Urine Few (A) None Seen /LPF    Mucus Urine Present (A) None Seen /LPF    Narrative    Urine Culture not indicated   Wet prep - Clinic Collect     Status: Abnormal    Specimen: Vagina; Swab   Result Value Ref Range    Trichomonas Absent Absent    Yeast Absent Absent    Clue Cells Present (A) Absent    WBCs/high power field 4+ (A) None   CBC with platelets and differential     Status: Abnormal    Narrative    The following orders were created for panel order CBC with platelets and differential.  Procedure                               Abnormality         Status                     ---------                               -----------         ------                     CBC with platelets and d...[[id number]]  Abnormal            Final result                 Please view results for these tests on the individual orders.     ASSESSMENT:    ICD-10-CM    1. Irregular menstrual bleeding  N92.6 Wet prep - Clinic Collect     UA Macro with Reflex to Micro and Culture - lab collect     HCG qualitative urine     CBC with platelets and differential     Comprehensive metabolic panel (BMP + Alb, Alk Phos, ALT, AST, Total. Bili, TP)     CBC with platelets and differential     Comprehensive metabolic panel (BMP + Alb, Alk Phos, ALT, AST, Total. Bili, TP)     UA Macro with Reflex to Micro and Culture - lab collect     HCG qualitative urine     metroNIDAZOLE (FLAGYL) 500 MG tablet     Urine Microscopic      Ob/Gyn Referral   2. BV (bacterial vaginosis)  N76.0 metroNIDAZOLE (FLAGYL) 500 MG tablet    B96.89 Ob/Gyn Referral   3. Generalized muscle weakness  M62.81      PLAN:     We discussed multiple differentials for abnormal menstrual bleeding including infection, anovulatory cycles, hormones, cyst, fibroid, endometriosis, IUD  Flagyl for BV, no alcohol on this  CBC, UA normal, no overwhelming infection or anemia  CMP pending we call for abnormals  HCG negative, no ectopic  GYN referral made, I would defer to them for further workup  Urgent care is limited without imaging beyond x-ray  ER if severe worsening    Patient Instructions     Patient Education     Understanding Uterine Bleeding  Your uterine bleeding may be heavy. Or you may have bleeding between periods. These problems may be caused by hormonal imbalance. Or they can be caused by uterine growths, an intrauterine device (IUD), bleeding disorder, or pregnancy.  Hormonal imbalance  Your menstrual cycle is controlled by hormones. The hormones include estrogen and progesterone. Sometimes there is too much or too little of one or both of these hormones, causing an imbalance. This can cause heavy periods. Or it can cause bleeding between periods. Causes of hormonal imbalance can include:    Hormonal changes in teens and in women nearing menopause    Diabetes, thyroid disease, or other medical problems    Obesity    Stress    Strenuous exercise    Anorexia, an eating disorder    Pregnancy  Uterine growths  There are different kinds of uterine growths. These include:    Fibroids. These are round knots of noncancer (benign) muscle tissue in the uterus.    Polyps. These are soft tissue growths in the uterine lining. They often extend into the uterus.    Adenomyosis. This is when the uterine lining grows into the muscle wall.    Hyperplasia. This is when the uterine lining gets too thick or grows too much.    Endometrial cancer. This is uncontrolled growth of part of  the uterine lining.  Other causes of uterine bleeding  There are other causes of uterine bleeding. These include:    IUD (intrauterine device). This is a method of birth control. Some IUDs contain hormones.    Bleeding disorders. This is when the blood can't clot properly.  Treatment  Your healthcare provider can help diagnose the cause of your bleeding problem. He or she will work with you to plan treatment as needed.  [employer] last reviewed this educational content on 5/1/2020 2000-2021 [employer]. All rights reserved. This information is not intended as a substitute for professional medical care. Always follow your healthcare professional's instructions.             Follow up with primary care provider with any problems, questions or concerns or if symptoms worsen or fail to improve. Patient agreed to plan and verbalized understanding.    CLARISSA Wagner-BC  [facility]

## 2021-11-04 LAB
ALBUMIN SERPL-MCNC: 3.8 G/DL (ref 3.4–5)
ALP SERPL-CCNC: 123 U/L (ref 40–150)
ALT SERPL W P-5'-P-CCNC: 22 U/L (ref 0–50)
ANION GAP SERPL CALCULATED.3IONS-SCNC: 11 MMOL/L (ref 3–14)
AST SERPL W P-5'-P-CCNC: 12 U/L (ref 0–45)
BILIRUB SERPL-MCNC: 0.3 MG/DL (ref 0.2–1.3)
BUN SERPL-MCNC: 11 MG/DL (ref 7–30)
CALCIUM SERPL-MCNC: 9.1 MG/DL (ref 8.5–10.1)
CHLORIDE BLD-SCNC: 107 MMOL/L (ref 94–109)
CO2 SERPL-SCNC: 20 MMOL/L (ref 20–32)
CREAT SERPL-MCNC: 0.8 MG/DL (ref 0.52–1.04)
GFR SERPL CREATININE-BSD FRML MDRD: >90 ML/MIN/1.73M2
GLUCOSE BLD-MCNC: 92 MG/DL (ref 70–99)
POTASSIUM BLD-SCNC: 4.3 MMOL/L (ref 3.4–5.3)
PROT SERPL-MCNC: 8.1 G/DL (ref 6.8–8.8)
SODIUM SERPL-SCNC: 138 MMOL/L (ref 133–144)